# Patient Record
Sex: MALE | Race: WHITE | HISPANIC OR LATINO | Employment: OTHER | URBAN - METROPOLITAN AREA
[De-identification: names, ages, dates, MRNs, and addresses within clinical notes are randomized per-mention and may not be internally consistent; named-entity substitution may affect disease eponyms.]

---

## 2017-02-01 ENCOUNTER — GENERIC CONVERSION - ENCOUNTER (OUTPATIENT)
Dept: OTHER | Facility: OTHER | Age: 62
End: 2017-02-01

## 2017-02-11 ENCOUNTER — TRANSCRIBE ORDERS (OUTPATIENT)
Dept: LAB | Facility: HOSPITAL | Age: 62
End: 2017-02-11

## 2017-02-11 ENCOUNTER — APPOINTMENT (OUTPATIENT)
Dept: LAB | Facility: HOSPITAL | Age: 62
End: 2017-02-11
Payer: MEDICARE

## 2017-02-11 DIAGNOSIS — E11.9 TYPE 2 DIABETES MELLITUS WITHOUT COMPLICATIONS (HCC): ICD-10-CM

## 2017-02-11 DIAGNOSIS — E55.9 VITAMIN D DEFICIENCY: ICD-10-CM

## 2017-02-11 DIAGNOSIS — C61 MALIGNANT NEOPLASM OF PROSTATE (HCC): ICD-10-CM

## 2017-02-11 DIAGNOSIS — E78.5 HYPERLIPIDEMIA: ICD-10-CM

## 2017-02-11 LAB
ALBUMIN SERPL BCP-MCNC: 3.7 G/DL (ref 3.5–5)
ALP SERPL-CCNC: 73 U/L (ref 46–116)
ALT SERPL W P-5'-P-CCNC: 40 U/L (ref 12–78)
ANION GAP SERPL CALCULATED.3IONS-SCNC: 6 MMOL/L (ref 4–13)
AST SERPL W P-5'-P-CCNC: 21 U/L (ref 5–45)
BILIRUB SERPL-MCNC: 0.41 MG/DL (ref 0.2–1)
BUN SERPL-MCNC: 27 MG/DL (ref 5–25)
CALCIUM SERPL-MCNC: 8.9 MG/DL (ref 8.3–10.1)
CHLORIDE SERPL-SCNC: 105 MMOL/L (ref 100–108)
CO2 SERPL-SCNC: 28 MMOL/L (ref 21–32)
CREAT SERPL-MCNC: 1.05 MG/DL (ref 0.6–1.3)
EST. AVERAGE GLUCOSE BLD GHB EST-MCNC: 174 MG/DL
GFR SERPL CREATININE-BSD FRML MDRD: >60 ML/MIN/1.73SQ M
GLUCOSE SERPL-MCNC: 143 MG/DL (ref 65–140)
HBA1C MFR BLD: 7.7 % (ref 4.2–6.3)
POTASSIUM SERPL-SCNC: 4.4 MMOL/L (ref 3.5–5.3)
PROT SERPL-MCNC: 7.9 G/DL (ref 6.4–8.2)
PSA SERPL-MCNC: 0.1 NG/ML (ref 0–4)
SODIUM SERPL-SCNC: 139 MMOL/L (ref 136–145)

## 2017-02-11 PROCEDURE — 83036 HEMOGLOBIN GLYCOSYLATED A1C: CPT

## 2017-02-11 PROCEDURE — 84153 ASSAY OF PSA TOTAL: CPT

## 2017-02-11 PROCEDURE — 36415 COLL VENOUS BLD VENIPUNCTURE: CPT

## 2017-02-11 PROCEDURE — 80053 COMPREHEN METABOLIC PANEL: CPT

## 2017-02-13 ENCOUNTER — ALLSCRIPTS OFFICE VISIT (OUTPATIENT)
Dept: OTHER | Facility: OTHER | Age: 62
End: 2017-02-13

## 2017-02-15 ENCOUNTER — ALLSCRIPTS OFFICE VISIT (OUTPATIENT)
Dept: OTHER | Facility: OTHER | Age: 62
End: 2017-02-15

## 2017-05-15 DIAGNOSIS — C61 MALIGNANT NEOPLASM OF PROSTATE (HCC): ICD-10-CM

## 2017-06-02 ENCOUNTER — TRANSCRIBE ORDERS (OUTPATIENT)
Dept: LAB | Facility: HOSPITAL | Age: 62
End: 2017-06-02

## 2017-06-02 ENCOUNTER — APPOINTMENT (OUTPATIENT)
Dept: LAB | Facility: HOSPITAL | Age: 62
End: 2017-06-02
Payer: MEDICARE

## 2017-06-02 DIAGNOSIS — C61 MALIGNANT NEOPLASM OF PROSTATE (HCC): ICD-10-CM

## 2017-06-02 LAB — PSA SERPL-MCNC: <0.1 NG/ML (ref 0–4)

## 2017-06-02 PROCEDURE — 36415 COLL VENOUS BLD VENIPUNCTURE: CPT

## 2017-06-02 PROCEDURE — 84153 ASSAY OF PSA TOTAL: CPT

## 2017-06-07 ENCOUNTER — ALLSCRIPTS OFFICE VISIT (OUTPATIENT)
Dept: OTHER | Facility: OTHER | Age: 62
End: 2017-06-07

## 2017-06-07 LAB
CLARITY UR: NORMAL
COLOR UR: YELLOW
GLUCOSE (HISTORICAL): NORMAL
HGB UR QL STRIP.AUTO: NORMAL
KETONES UR STRIP-MCNC: NORMAL MG/DL
LEUKOCYTE ESTERASE UR QL STRIP: NORMAL
NITRITE UR QL STRIP: NORMAL
PH UR STRIP.AUTO: 5 [PH]
PROT UR STRIP-MCNC: NORMAL MG/DL
SP GR UR STRIP.AUTO: 1.01

## 2017-08-01 DIAGNOSIS — E11.9 TYPE 2 DIABETES MELLITUS WITHOUT COMPLICATIONS (HCC): ICD-10-CM

## 2017-08-01 DIAGNOSIS — F41.8 OTHER SPECIFIED ANXIETY DISORDERS: ICD-10-CM

## 2017-08-01 DIAGNOSIS — E78.5 HYPERLIPIDEMIA: ICD-10-CM

## 2017-08-01 DIAGNOSIS — E55.9 VITAMIN D DEFICIENCY: ICD-10-CM

## 2017-08-01 DIAGNOSIS — K21.9 GASTRO-ESOPHAGEAL REFLUX DISEASE WITHOUT ESOPHAGITIS: ICD-10-CM

## 2017-08-01 DIAGNOSIS — I10 ESSENTIAL (PRIMARY) HYPERTENSION: ICD-10-CM

## 2017-09-07 DIAGNOSIS — C61 MALIGNANT NEOPLASM OF PROSTATE (HCC): ICD-10-CM

## 2017-09-12 ENCOUNTER — APPOINTMENT (OUTPATIENT)
Dept: LAB | Facility: HOSPITAL | Age: 62
End: 2017-09-12
Payer: MEDICARE

## 2017-09-12 ENCOUNTER — TRANSCRIBE ORDERS (OUTPATIENT)
Dept: LAB | Facility: HOSPITAL | Age: 62
End: 2017-09-12

## 2017-09-12 DIAGNOSIS — E11.9 TYPE 2 DIABETES MELLITUS WITHOUT COMPLICATIONS (HCC): ICD-10-CM

## 2017-09-12 DIAGNOSIS — C61 MALIGNANT NEOPLASM OF PROSTATE (HCC): ICD-10-CM

## 2017-09-12 DIAGNOSIS — E78.5 HYPERLIPIDEMIA: ICD-10-CM

## 2017-09-12 DIAGNOSIS — E55.9 VITAMIN D DEFICIENCY: ICD-10-CM

## 2017-09-12 DIAGNOSIS — K21.9 GASTRO-ESOPHAGEAL REFLUX DISEASE WITHOUT ESOPHAGITIS: ICD-10-CM

## 2017-09-12 DIAGNOSIS — I10 ESSENTIAL (PRIMARY) HYPERTENSION: ICD-10-CM

## 2017-09-12 DIAGNOSIS — F41.8 OTHER SPECIFIED ANXIETY DISORDERS: ICD-10-CM

## 2017-09-12 LAB
25(OH)D3 SERPL-MCNC: 39 NG/ML (ref 30–100)
ALBUMIN SERPL BCP-MCNC: 3.6 G/DL (ref 3.5–5)
ALP SERPL-CCNC: 72 U/L (ref 46–116)
ALT SERPL W P-5'-P-CCNC: 41 U/L (ref 12–78)
ANION GAP SERPL CALCULATED.3IONS-SCNC: 7 MMOL/L (ref 4–13)
AST SERPL W P-5'-P-CCNC: 31 U/L (ref 5–45)
BILIRUB SERPL-MCNC: 0.49 MG/DL (ref 0.2–1)
BUN SERPL-MCNC: 17 MG/DL (ref 5–25)
CALCIUM SERPL-MCNC: 9.2 MG/DL (ref 8.3–10.1)
CHLORIDE SERPL-SCNC: 103 MMOL/L (ref 100–108)
CHOLEST SERPL-MCNC: 197 MG/DL (ref 50–200)
CO2 SERPL-SCNC: 28 MMOL/L (ref 21–32)
CREAT SERPL-MCNC: 0.88 MG/DL (ref 0.6–1.3)
EST. AVERAGE GLUCOSE BLD GHB EST-MCNC: 151 MG/DL
GFR SERPL CREATININE-BSD FRML MDRD: 92 ML/MIN/1.73SQ M
GLUCOSE P FAST SERPL-MCNC: 141 MG/DL (ref 65–99)
HBA1C MFR BLD: 6.9 % (ref 4.2–6.3)
HDLC SERPL-MCNC: 46 MG/DL (ref 40–60)
LDLC SERPL CALC-MCNC: 96 MG/DL (ref 0–100)
POTASSIUM SERPL-SCNC: 4.2 MMOL/L (ref 3.5–5.3)
PROT SERPL-MCNC: 7.6 G/DL (ref 6.4–8.2)
PSA SERPL-MCNC: 0.2 NG/ML (ref 0–4)
SODIUM SERPL-SCNC: 138 MMOL/L (ref 136–145)
TRIGL SERPL-MCNC: 274 MG/DL

## 2017-09-12 PROCEDURE — 82306 VITAMIN D 25 HYDROXY: CPT

## 2017-09-12 PROCEDURE — 80053 COMPREHEN METABOLIC PANEL: CPT

## 2017-09-12 PROCEDURE — 84153 ASSAY OF PSA TOTAL: CPT

## 2017-09-12 PROCEDURE — 80061 LIPID PANEL: CPT

## 2017-09-12 PROCEDURE — 36415 COLL VENOUS BLD VENIPUNCTURE: CPT

## 2017-09-12 PROCEDURE — 83036 HEMOGLOBIN GLYCOSYLATED A1C: CPT

## 2017-09-13 ENCOUNTER — ALLSCRIPTS OFFICE VISIT (OUTPATIENT)
Dept: OTHER | Facility: OTHER | Age: 62
End: 2017-09-13

## 2017-09-15 ENCOUNTER — ALLSCRIPTS OFFICE VISIT (OUTPATIENT)
Dept: OTHER | Facility: OTHER | Age: 62
End: 2017-09-15

## 2017-10-10 ENCOUNTER — GENERIC CONVERSION - ENCOUNTER (OUTPATIENT)
Dept: OTHER | Facility: OTHER | Age: 62
End: 2017-10-10

## 2017-10-10 LAB
LEFT EYE DIABETIC RETINOPATHY: NORMAL
RIGHT EYE DIABETIC RETINOPATHY: NORMAL

## 2017-11-16 DIAGNOSIS — D18.03 HEMANGIOMA OF INTRA-ABDOMINAL STRUCTURES: ICD-10-CM

## 2017-12-13 DIAGNOSIS — C61 MALIGNANT NEOPLASM OF PROSTATE (HCC): ICD-10-CM

## 2018-01-13 VITALS
HEIGHT: 68 IN | DIASTOLIC BLOOD PRESSURE: 88 MMHG | WEIGHT: 184.13 LBS | BODY MASS INDEX: 27.91 KG/M2 | SYSTOLIC BLOOD PRESSURE: 124 MMHG | HEART RATE: 88 BPM

## 2018-01-13 VITALS
SYSTOLIC BLOOD PRESSURE: 132 MMHG | HEIGHT: 69 IN | RESPIRATION RATE: 16 BRPM | BODY MASS INDEX: 27.11 KG/M2 | TEMPERATURE: 98.1 F | DIASTOLIC BLOOD PRESSURE: 80 MMHG | WEIGHT: 183 LBS | HEART RATE: 74 BPM | OXYGEN SATURATION: 99 %

## 2018-01-13 NOTE — RESULT NOTES
Verified Results  (1) IRON SATURATION %, TIBC [id number] 09:27AM Jonatan Encinas     Test Name Result Flag Reference   IRON SATURATION 31 %     TOTAL IRON BINDING CAPACITY 330 ug/dL  250-450   IRON 103 ug/dL

## 2018-01-13 NOTE — RESULT NOTES
Verified Results  (1) CBC/PLT/DIFF 07Oct2016 09:27AM Nisha Kenyetta     Test Name Result Flag Reference   WBC COUNT 6 26 Thousand/uL  4 31-10 16   RBC COUNT 4 35 Million/uL  3 88-5 62   HEMOGLOBIN 13 5 g/dL  12 0-17 0   HEMATOCRIT 40 0 %  36 5-49 3   MCV 92 fL  82-98   MCH 31 0 pg  26 8-34 3   MCHC 33 8 g/dL  31 4-37 4   RDW 12 7 %  11 6-15 1   MPV 10 9 fL  8 9-12 7   PLATELET COUNT 497 Thousands/uL  149-390   nRBC AUTOMATED 0 /100 WBCs     NEUTROPHILS RELATIVE PERCENT 51 %  43-75   LYMPHOCYTES RELATIVE PERCENT 33 %  14-44   MONOCYTES RELATIVE PERCENT 14 % H 4-12   EOSINOPHILS RELATIVE PERCENT 2 %  0-6   BASOPHILS RELATIVE PERCENT 0 %  0-1   NEUTROPHILS ABSOLUTE COUNT 3 10 Thousands/?L  1 85-7 62   LYMPHOCYTES ABSOLUTE COUNT 2 09 Thousands/?L  0 60-4 47   MONOCYTES ABSOLUTE COUNT 0 89 Thousand/?L  0 17-1 22   EOSINOPHILS ABSOLUTE COUNT 0 14 Thousand/?L  0 00-0 61   BASOPHILS ABSOLUTE COUNT 0 02 Thousands/?L  0 00-0 10   - Patient Instructions: This bloodwork is non-fasting  Please drink two glasses of water morning of bloodwork  - Patient Instructions: This bloodwork is non-fasting  Please drink two glasses of water morning of bloodwork

## 2018-01-14 VITALS
HEART RATE: 72 BPM | HEIGHT: 68 IN | BODY MASS INDEX: 28.49 KG/M2 | SYSTOLIC BLOOD PRESSURE: 122 MMHG | WEIGHT: 188 LBS | DIASTOLIC BLOOD PRESSURE: 90 MMHG

## 2018-01-14 VITALS
HEART RATE: 72 BPM | BODY MASS INDEX: 28.66 KG/M2 | SYSTOLIC BLOOD PRESSURE: 140 MMHG | DIASTOLIC BLOOD PRESSURE: 92 MMHG | HEIGHT: 69 IN | WEIGHT: 193.5 LBS

## 2018-01-14 VITALS
SYSTOLIC BLOOD PRESSURE: 138 MMHG | BODY MASS INDEX: 28.47 KG/M2 | DIASTOLIC BLOOD PRESSURE: 88 MMHG | RESPIRATION RATE: 16 BRPM | OXYGEN SATURATION: 99 % | HEIGHT: 69 IN | WEIGHT: 192.25 LBS | TEMPERATURE: 98.9 F | HEART RATE: 76 BPM

## 2018-01-14 NOTE — RESULT NOTES
Verified Results  (1) FERRITIN [id number] 09:27AM Sylvain Dumont     Test Name Result Flag Reference   FERRITIN 73 ng/mL  3-442

## 2018-01-15 NOTE — RESULT NOTES
Verified Results  (1) TISSUE EXAM [id number] 08:20AM Jorge Latch     Test Name Result Flag Reference   LAB AP CASE REPORT (Report)     Surgical Pathology Report             Case: [id number]                   Authorizing Provider: Louretta Apgar, DO    Collected:      10/12/2016 0820        Ordering Location:   [address] [place] [address]   Received:      10/12/2016 [place] [address] Endoscopy                               Pathologist:      Alireza Villanueva MD                                Specimen:  Esophagus, Esophagus at 38cm bx   LAB AP FINAL DIAGNOSIS (Report)     A  Esophagus, Esophagus at 38cm biopsy:   -Gastroesophageal junction mucosa with cardiac type glands present   in-between squamous mucosa (esophageal columnar metaplasia)  -Mild chronic inflammation with reactive epithelial changes   -No evidence of Goblet cell metaplasia seen   -No evidence of glandular dysplasia or malignancy  Electronically signed by Alireza Villanueva MD on 10/13/2016 at 9:31 AM   LAB AP NOTE      Interpretation performed at [address] [place] [address]    [facility] (Report)     These tests were developed and their performance characteristics   determined by [facility] or [facility]  They may not be cleared or approved by the [place]  Food and   Drug Administration  The FDA has determined that such clearance or   approval is not necessary  These tests are used for clinical purposes  They should not be regarded as investigational or for research  This   laboratory has been approved by CLIA 88, designated as a high-complexity   laboratory and is qualified to perform these tests  LAB AP GROSS DESCRIPTION (Report)     A   The specimen is received in formalin, labeled with the patient's name   and hospital number, and is designated esophagus at 38 cm biopsy, are   two irregularly shaped fragments of tan-pink soft tissue measuring 0 5 and 0 4 cm in greatest dimension  Entirely submitted  One cassette  Note: The estimated total formalin fixation time based upon information   provided by the submitting clinician and the standard processing schedule   is 6 0 hours        RLR   LAB AP CLINICAL INFORMATION      Pink mucosa, r/o Barretts   Pink mucosa, r/o Barretts

## 2018-01-17 NOTE — PROGRESS NOTES
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)   3  Disc degeneration, lumbar (722 52) (M51 36)   4  Exercise-induced asthma (493 81) (J45 990)   5  GERD without esophagitis (530 81) (K21 9)   6  Hyperlipidemia (272 4) (E78 5)   7  Hypertension (401 9) (I10)   8  Influenza vaccine needed (V04 81) (Z23)   9  History of Lumbar radiculopathy (724 4) (M54 16)   10  Need for Tdap vaccination (V06 1) (Z23)   11  Need for zoster vaccine (V04 89) (Z23)   12  Need for zoster vaccine (V04 89) (Z23)   13  Other post-surgical erectile dysfunction (607 84) (N52 39)   14  Pre-op evaluation (V72 84) (Z01 818)   15  Prostate cancer (185) (C61)   16  Screening for colorectal cancer (V76 51) (Z12 11,Z12 12)   17  Special screening examination for neoplasm of prostate (V76 44) (Z12 5)   18  Type 2 diabetes mellitus (250 00) (E11 9)   19  Umbilical hernia (556 4) (K42 9)   20  Vitamin D deficiency (268 9) (E55 9)    Current Meds   1  Aspirin 81 MG TABS; TAKE 1 TABLET DAILY; Therapy: (Recorded:89Bku5887) to Recorded   2  Calcium 600-D 600-400 MG-UNIT Oral Tablet; Take 1 tablet daily; Therapy: 41AFL2688 to (Last HX:47CWK6115)  Requested for: 50Bih0921 Ordered   3  Cetaphil RestoraDerm External Lotion; APPLY 1 INCH Every twelve hours; Therapy: 02RLX5051 to (Last Rx:91Jce0071) Ordered   4  Fluticasone Propionate 50 MCG/ACT Nasal Suspension; use 2 sprays in each nostril   once daily; Therapy: 06SEA1841 to (Caitlyn Goodman)  Requested for: 09Itv7362 Recorded   5  Gmate Blood Glucose Test In Vitro Strip; TEST ONCE DAILY; Therapy: 73SUD0394 to (Evaluate:34Yhi4357)  Requested for: 21SFN5694; Last   Rx:54Dyb5814 Ordered   6  HydroCHLOROthiazide 12 5 MG Oral Tablet; take 1 tablet by mouth once daily; Therapy: 03Sep2015 to (Evaluate:25Xjm1521)  Requested for: 25ZKE1004; Last   Rx:13Jun2016 Ordered   7   Latanoprost 0 005 % Ophthalmic Solution; INSTILL 1 DROP IN BOTH EYES AT   BEDTIME; Therapy: (Recorded:17Jan2014) to Recorded   8  Lisinopril 20 MG Oral Tablet; take 1 tablet by mouth once daily; Therapy: 56FHI0237 to (Evaluate:10Dec2016)  Requested for: 70MPU3171; Last   Rx:13Jun2016 Ordered   9  LORazepam 1 MG Oral Tablet; TAKE 1/2 TO 1 TAB TWICE A DAY AS NEEDED FOR   ANXIETY  AVOID WITH ALCOHOL; Therapy: 53TWK8996 to (Last Rx:15Jun2016) Ordered   10  MetFORMIN HCl - 1000 MG Oral Tablet; take 1 tablet by mouth twice a day; Therapy: 12IKR6331 to (Evaluate:10Dec2016)  Requested for: 82FTC8273; Last    Rx:13Jun2016 Ordered   11  Omeprazole 40 MG Oral Capsule Delayed Release; TAKE 1 CAPSULE DAILY; Therapy: 18JCU2380 to (Evaluate:12Jan2017)  Requested for: 45Qlk0137; Last    Rx:57Mks6050 Ordered   12  Pravastatin Sodium 40 MG Oral Tablet; TAKE 1 TABLET DAILY; Therapy: 09Pee0248 to (Evaluate:10Dec2016)  Requested for: 96DAH9122; Last    Rx:13Jun2016 Ordered   13  ProAir  (90 Base) MCG/ACT Inhalation Aerosol Solution; INHALE 1 TO 2 PUFFS    EVERY 4 TO 6 HOURS AS NEEDED before exercise; Therapy: 58WDR2049 to (Last Rx:02Mar2015)  Requested for: 35GWU1829 Ordered   14  Vitamin D 2000 UNIT Oral Capsule; take 1 capsule daily; Therapy: 06LMK5426 to (Last Rx:28Oct2014)  Requested for: 85Kyq4311 Ordered    Allergies    1  escitalopram    Assessment    1   Influenza vaccine needed (V04 81) (Z23)    Plan  Influenza vaccine needed    · Fluzone Quadrivalent 0 5 ML Intramuscular Suspension    Future Appointments    Date/Time Provider Specialty Site   10/24/2016 09:30 AM Michaela Layne Dr FAMILY PRACTICE   12/15/2016 02:30 PM Shante Page [facility] SPECIAL   11/10/2016 09:30 AM Dionicio Bridges MD Urology [address]     Signatures   Electronically signed by : MYLES Montoya ; Oct 20 2016  2:55PM EST (Author)

## 2018-03-05 DIAGNOSIS — I10 ESSENTIAL (PRIMARY) HYPERTENSION: ICD-10-CM

## 2018-03-05 DIAGNOSIS — E55.9 VITAMIN D DEFICIENCY: ICD-10-CM

## 2018-03-05 DIAGNOSIS — E11.9 TYPE 2 DIABETES MELLITUS WITHOUT COMPLICATIONS (HCC): ICD-10-CM

## 2018-03-05 DIAGNOSIS — K21.9 GASTRO-ESOPHAGEAL REFLUX DISEASE WITHOUT ESOPHAGITIS: ICD-10-CM

## 2018-03-05 DIAGNOSIS — F41.8 OTHER SPECIFIED ANXIETY DISORDERS: ICD-10-CM

## 2018-03-05 DIAGNOSIS — E78.5 HYPERLIPIDEMIA: ICD-10-CM

## 2018-06-11 ENCOUNTER — TELEPHONE (OUTPATIENT)
Dept: UROLOGY | Facility: HOSPITAL | Age: 63
End: 2018-06-11

## 2018-06-11 NOTE — TELEPHONE ENCOUNTER
Pt signed a medical record release form  Records are ready, however the number of the form will not work  Called pt and left message to please call back with where he wants it    Number on form is [phone] is not working

## 2018-06-12 ENCOUNTER — TELEPHONE (OUTPATIENT)
Dept: UROLOGY | Facility: AMBULATORY SURGERY CENTER | Age: 63
End: 2018-06-12

## 2018-06-12 NOTE — TELEPHONE ENCOUNTER
Spoke with Grisel, she said she can  the records if possible  She leaves for [place] on Friday   Please call her back and let her know if possible Thank you

## 2018-06-25 NOTE — TELEPHONE ENCOUNTER
Spoke with [facility] who is in [place], she gave me the address to where her father is staying which is [address] , [address] [place] [address]   I mailed the records today